# Patient Record
Sex: MALE | Race: WHITE | Employment: FULL TIME | URBAN - METROPOLITAN AREA
[De-identification: names, ages, dates, MRNs, and addresses within clinical notes are randomized per-mention and may not be internally consistent; named-entity substitution may affect disease eponyms.]

---

## 2020-06-23 ENCOUNTER — PATIENT OUTREACH (OUTPATIENT)
Dept: CASE MANAGEMENT | Age: 30
End: 2020-06-23

## 2020-06-23 NOTE — PROGRESS NOTES
Yellow alert noted in remote symptom monitoring program. Messaged patient to notify Linda Garcia if symptoms have worsened since yesterday or if they would like to have a nurse reach out. Patient notes:    I was tested on Sunday for covid. My symptoms are in line with covid or the flu. Was just curious how long the symptoms would last. Also, when I sign in it says Welcome Jose Caraballo, and my name is Ericka Moreira. I was hoping this was a typo and my chart is still correct       CTN response: I will edit your info and fix the error. This system does not reflect any medical record so that wont have any effect and is the result of an entering error more than likely. The results are taking a few days and you should expect to hear relatively soon. As far as symptoms, everyone experiences a different mix of symptoms and severity of each. Fatigue has seemed to be the longest lingering symptom for most of the folks I talk with. I have spoken to some folks with symptoms for 3-4 days, some with none at all and others for > a week. The biggest concern would be for increasing in severity or increase in shortness of breath that would need to be assessed sooner than later. Symptom management is the best option at this point- were you recommended any OTC medications? Rest, staying hydrated are the best two other recommendations after symptom relief.    Let me know if you need information or resources to set up a virtual visit

## 2020-06-24 ENCOUNTER — PATIENT OUTREACH (OUTPATIENT)
Dept: CASE MANAGEMENT | Age: 30
End: 2020-06-24

## 2020-06-24 NOTE — PROGRESS NOTES
Yellow alert noted in remote symptom monitoring program. Messaged patient to notify Micheal Naidu if symptoms have worsened since yesterday or if they would like to have a nurse reach out.     Patient comment:  I feel better today no fever chills or aches still have a cough and lethargy but overall better than last 3 days

## 2020-06-25 ENCOUNTER — PATIENT OUTREACH (OUTPATIENT)
Dept: CASE MANAGEMENT | Age: 30
End: 2020-06-25

## 2020-06-25 NOTE — PROGRESS NOTES
Yellow alert noted in remote symptom monitoring program. Messaged patient to notify Bailey Clements if symptoms have worsened since yesterday or if they would like to have a nurse reach out.

## 2020-06-26 ENCOUNTER — PATIENT OUTREACH (OUTPATIENT)
Dept: CASE MANAGEMENT | Age: 30
End: 2020-06-26

## 2020-06-26 NOTE — PROGRESS NOTES
Yellow alert noted in remote symptom monitoring program. Messaged patient to notify Manoj Sevilla if symptoms have worsened since yesterday or if they would like to have a nurse reach out.